# Patient Record
Sex: FEMALE | Race: WHITE
[De-identification: names, ages, dates, MRNs, and addresses within clinical notes are randomized per-mention and may not be internally consistent; named-entity substitution may affect disease eponyms.]

---

## 2022-07-05 ENCOUNTER — HOSPITAL ENCOUNTER (EMERGENCY)
Dept: HOSPITAL 75 - ER | Age: 57
End: 2022-07-05
Payer: SELF-PAY

## 2022-07-05 DIAGNOSIS — G43.909: Primary | ICD-10-CM
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
## 2022-07-05 PROCEDURE — 71111 X-RAY EXAM RIBS/CHEST4/> VWS: CPT

## 2022-07-05 PROCEDURE — 72125 CT NECK SPINE W/O DYE: CPT

## 2022-07-05 PROCEDURE — 70450 CT HEAD/BRAIN W/O DYE: CPT

## 2022-07-05 NOTE — DIAGNOSTIC IMAGING REPORT
INDICATION: Fall.



Time of Exam: 6:38 PM



No displaced rib fractures seen. No parenchymal contusion,

effusion or pneumothorax is identified.



IMPRESSION: No displaced rib fracture is identified.



Dictated by: 



  Dictated on workstation # [id number]

## 2022-07-05 NOTE — DIAGNOSTIC IMAGING REPORT
PROCEDURE: CT head and CT cervical spine without contrast.



TECHNIQUE: Multiple contiguous axial images were obtained through

the brain and cervical spine without the use of intravenous

contrast. Sagittal and coronal reformations through the cervical

spine were then performed. Auto Exposure Controls were utilized

during the CT exam to meet ALARA standards for radiation dose

reduction. 



INDICATION:  Fall with head and neck injury.



COMPARISON: No prior studies are available for comparison.



CT HEAD: Postoperative changes left temporal craniectomy are

noted. There is a small area of encephalomalacia in the left

temporal lobe. Ventricular size is normal. There is no midline

shift. There is minimal increased density noted within the

extra-axial space just deep to the surgical bed along the left

temporal convexity, likely representing some dural thickening.

Hemorrhage would be less likely due to the postsurgical changes

but correlation with prior CTs, if available, would be useful.

There is no midline shift. Cisterns are patent. Visualized

paranasal sinuses are clear.



IMPRESSION: 

1. Postsurgical changes, as described with probable dural

thickening along the left temporal frontal convexity. Correlation

with outside CT studies would be useful. No other significant

abnormality is seen.





CT CERVICAL SPINE: Curvature and alignment of the cervical spine

is normal. There is generalized degenerative disc and facet

disease. No fractures are seen. Prevertebral tissues are normal.

Odontoid is intact.



IMPRESSION: 

1. Cervical spondylosis. No acute bony abnormality is detected.



 



Dictated by: 



  Dictated on workstation # [id number]

## 2022-07-05 NOTE — ED FALL/INJURY
General


Chief Complaint:  Head/Cervical Problems


Stated Complaint:  ANKLE PAIN


Source:  patient


Exam Limitations:  intoxication





History of Present Illness


Date Seen by Provider:  Jul 5, 2022


Time Seen by Provider:  18:40


Initial Comments


This is a well-appearing 56-year-old female who presents to the ER with 

complaints of pain in her left rib region and neck after she fell. States her 

ankle gave out on her and caused her to fall. At this time she is denying ankle 

pain.





Allergies and Home Medications


Allergies


Coded Allergies:  


     No Known Drug Allergies (Unverified , 7/5/22)





Patient Home Medication List


Home Medication List Reviewed:  Yes





Physical Exam


Vital Signs


Capillary Refill :


Height, Weight, BMI


Height: '"


Weight: lbs. oz. kg;  BMI


Method:





Progress/Results/Core Measures


Results/Orders


My Orders





Orders - BRY VICTOR


Ct Head/Cervical Spine Wo (7/5/22 18:22)


Ribs/Bilat With Chest (7/5/22 18:26)


Acetaminophen/Codeine Tablet (Tylenol W/ (7/5/22 19:30)








Departure


Impression





   Primary Impression:  


   Migraine


   Additional Impression:  


   Fall


Disposition:  01 HOME, SELF-CARE


Condition:  Improved





Departure-Patient Inst.


Decision time for Depature:  19:38


Referrals:  


NO,LOCAL PHYSICIAN (PCP/Family)


Primary Care Physician


Patient Instructions:  Migraines in Adults





Add. Discharge Instructions:  


Plan: 


1. Follow up with your doctor later this week. 


2. May take Tylenol or Ibuprofen as needed for pain per package. 


3. Return to ER for any new, concerning, or worsening symptoms. 





All discharge instructions reviewed with patient and/or family. Voiced underst

anding.











BRY VICTOR            Jul 5, 2022 18:40